# Patient Record
Sex: MALE | Race: WHITE | NOT HISPANIC OR LATINO | Employment: FULL TIME | URBAN - METROPOLITAN AREA
[De-identification: names, ages, dates, MRNs, and addresses within clinical notes are randomized per-mention and may not be internally consistent; named-entity substitution may affect disease eponyms.]

---

## 2023-12-09 ENCOUNTER — HOSPITAL ENCOUNTER (EMERGENCY)
Facility: HOSPITAL | Age: 28
Discharge: HOME | End: 2023-12-09
Attending: EMERGENCY MEDICINE
Payer: COMMERCIAL

## 2023-12-09 VITALS
TEMPERATURE: 97.8 F | DIASTOLIC BLOOD PRESSURE: 75 MMHG | OXYGEN SATURATION: 99 % | SYSTOLIC BLOOD PRESSURE: 136 MMHG | HEART RATE: 80 BPM | RESPIRATION RATE: 18 BRPM | WEIGHT: 166.5 LBS

## 2023-12-09 DIAGNOSIS — S46.811A TRAPEZIUS MUSCLE STRAIN, RIGHT, INITIAL ENCOUNTER: Primary | ICD-10-CM

## 2023-12-09 PROCEDURE — 99282 EMERGENCY DEPT VISIT SF MDM: CPT

## 2023-12-09 PROCEDURE — 99284 EMERGENCY DEPT VISIT MOD MDM: CPT

## 2023-12-09 PROCEDURE — 96372 THER/PROPH/DIAG INJ SC/IM: CPT

## 2023-12-09 RX ORDER — NAPROXEN 500 MG/1
500 TABLET ORAL 2 TIMES DAILY WITH MEALS
Qty: 30 TABLET | Refills: 0 | Status: SHIPPED | OUTPATIENT
Start: 2023-12-09

## 2023-12-09 RX ORDER — LIDOCAINE 50 MG/G
1 PATCH TOPICAL ONCE
Status: DISCONTINUED | OUTPATIENT
Start: 2023-12-09 | End: 2023-12-09 | Stop reason: HOSPADM

## 2023-12-09 RX ORDER — METHOCARBAMOL 500 MG/1
500 TABLET, FILM COATED ORAL 2 TIMES DAILY PRN
Qty: 20 TABLET | Refills: 0 | Status: SHIPPED | OUTPATIENT
Start: 2023-12-09

## 2023-12-09 RX ORDER — KETOROLAC TROMETHAMINE 30 MG/ML
15 INJECTION, SOLUTION INTRAMUSCULAR; INTRAVENOUS ONCE
Status: COMPLETED | OUTPATIENT
Start: 2023-12-09 | End: 2023-12-09

## 2023-12-09 RX ORDER — METHOCARBAMOL 500 MG/1
500 TABLET, FILM COATED ORAL ONCE
Status: COMPLETED | OUTPATIENT
Start: 2023-12-09 | End: 2023-12-09

## 2023-12-09 RX ORDER — LIDOCAINE 50 MG/G
1 PATCH TOPICAL EVERY 24 HOURS
Qty: 15 PATCH | Refills: 0 | Status: SHIPPED | OUTPATIENT
Start: 2023-12-09

## 2023-12-09 RX ADMIN — LIDOCAINE 1 PATCH: 700 PATCH TOPICAL at 05:40

## 2023-12-09 RX ADMIN — KETOROLAC TROMETHAMINE 15 MG: 30 INJECTION, SOLUTION INTRAMUSCULAR; INTRAVENOUS at 05:35

## 2023-12-09 RX ADMIN — METHOCARBAMOL TABLETS 500 MG: 500 TABLET, COATED ORAL at 05:34

## 2023-12-09 NOTE — ED PROVIDER NOTES
History  Chief Complaint   Patient presents with    Neck Pain     Hard time turning his head to the left since last night. Took aleeve without relief. The patient is a 45-year-old male with no pertinent past medical history, presents for evaluation of neck pain. He reports that he was wrestling with someone last night and since that time has had neck pain that is exacerbated when turning his head to the right. No direct trauma to the neck. No associated extremity weakness, paresthesias, or injury to the overlying skin. He took 2 Aleve without relief. None       History reviewed. No pertinent past medical history. History reviewed. No pertinent surgical history. History reviewed. No pertinent family history. I have reviewed and agree with the history as documented. E-Cigarette/Vaping    E-Cigarette Use Never User      E-Cigarette/Vaping Substances     Social History     Tobacco Use    Smoking status: Never    Smokeless tobacco: Never   Vaping Use    Vaping Use: Never used   Substance Use Topics    Alcohol use: Not Currently    Drug use: Never       Review of Systems   Constitutional:  Negative for chills and fever. HENT:  Negative for ear pain and sore throat. Eyes:  Negative for pain and visual disturbance. Respiratory:  Negative for cough and shortness of breath. Cardiovascular:  Negative for chest pain and palpitations. Gastrointestinal:  Negative for abdominal pain and vomiting. Genitourinary:  Negative for dysuria and hematuria. Musculoskeletal:  Positive for neck pain and neck stiffness. Negative for arthralgias and back pain. Skin:  Negative for color change and rash. Neurological:  Negative for seizures, syncope, weakness, numbness and headaches. All other systems reviewed and are negative. Physical Exam  Physical Exam  Vitals and nursing note reviewed. Constitutional:       General: He is awake. Appearance: Normal appearance.  He is well-developed and normal weight. He is not toxic-appearing. HENT:      Head: Normocephalic and atraumatic. Right Ear: External ear normal.      Left Ear: External ear normal.      Nose: Nose normal.      Mouth/Throat:      Lips: Pink. Mouth: Mucous membranes are moist.   Eyes:      General: Lids are normal. Vision grossly intact. Gaze aligned appropriately. Conjunctiva/sclera: Conjunctivae normal.      Pupils: Pupils are equal, round, and reactive to light. Neck:      Meningeal: Brudzinski's sign absent. Cardiovascular:      Rate and Rhythm: Normal rate and regular rhythm. Pulmonary:      Effort: Pulmonary effort is normal. No respiratory distress. Chest:      Chest wall: No deformity, swelling, tenderness, crepitus or edema. Musculoskeletal:      Cervical back: Neck supple. Tenderness present. No rigidity, spasms, torticollis, bony tenderness or crepitus. Pain with movement present. Thoracic back: Tenderness present. No spasms or bony tenderness. Lumbar back: Normal. No spasms, tenderness or bony tenderness. Comments: TTP along the right trapezius muscle distribution. No TTP of the c- or t-spine. No muscle spasm, crepitus, or palpable vertebral deformity. Near complete AROM of the neck, with slightly limited right lateral movement. Full AROM and 5/5 strength in the upper extremities. Normal  strength. Sensation is intact throughout. Skin:     General: Skin is warm. Capillary Refill: Capillary refill takes less than 2 seconds. Coloration: Skin is not pale. Findings: No abrasion, bruising, ecchymosis, erythema, signs of injury, rash or wound. Neurological:      Mental Status: He is alert and oriented to person, place, and time. Psychiatric:         Behavior: Behavior is cooperative.          Vital Signs  ED Triage Vitals [12/09/23 0522]   Temperature Pulse Respirations Blood Pressure SpO2   97.8 °F (36.6 °C) 80 18 136/75 99 %      Temp Source Heart Rate Source Patient Position - Orthostatic VS BP Location FiO2 (%)   Tympanic Monitor Sitting Left arm --      Pain Score       --           Vitals:    12/09/23 0522   BP: 136/75   Pulse: 80   Patient Position - Orthostatic VS: Sitting         ED Medications  Medications   lidocaine (LIDODERM) 5 % patch 1 patch (1 patch Topical Medication Applied 12/9/23 0540)   ketorolac (TORADOL) injection 15 mg (15 mg Intramuscular Given 12/9/23 0535)   methocarbamol (ROBAXIN) tablet 500 mg (500 mg Oral Given 12/9/23 0534)       Diagnostic Studies  Results Reviewed       None                   No orders to display              Procedures  Procedures         ED Course         SBIRT 22yo+      Flowsheet Row Most Recent Value   Initial Alcohol Screen: US AUDIT-C     1. How often do you have a drink containing alcohol? 0 Filed at: 12/09/2023 0524   2. How many drinks containing alcohol do you have on a typical day you are drinking? 0 Filed at: 12/09/2023 0524   3a. Male UNDER 65: How often do you have five or more drinks on one occasion? 0 Filed at: 12/09/2023 0524   3b. FEMALE Any Age, or MALE 65+: How often do you have 4 or more drinks on one occassion? 0 Filed at: 12/09/2023 0524   Audit-C Score 0 Filed at: 12/09/2023 4276   IVET: How many times in the past year have you. .. Used an illegal drug or used a prescription medication for non-medical reasons? Never Filed at: 12/09/2023 3980                Medical Decision Making  Patient presents for acute neck pain. On exam there is TTP of the right trapezius muscle distribution. The upper extremities are neurovascularly intact. Differential diagnosis includes but is not limited to strain, sprain, arthritis, spinal stenosis, herniated disc, or radiculopathy. As there was no direct trauma to the neck, will not pursue imaging, suspicion for fracture is very low. Patient given Toradol, Robaxin, and a lidocaine patch.   Emphasized to the patient that he may not drive or operate any heavy machinery while taking the muscle relaxant. He is in agreement with the treatment plan and all questions were answered. Patient verbalized understanding of both the muscle relaxant and ED return precautions. Follow-up with PCP, and return to the ED in the interim for new or worsening symptoms. Problems Addressed:  Trapezius muscle strain, right, initial encounter: acute illness or injury    Risk  Prescription drug management. Disposition  Final diagnoses:   Trapezius muscle strain, right, initial encounter     Time reflects when diagnosis was documented in both MDM as applicable and the Disposition within this note       Time User Action Codes Description Comment    12/9/2023  5:34 AM Michelle Shafer Add [K58.146W] Trapezius muscle strain, right, initial encounter           ED Disposition       ED Disposition   Discharge    Condition   Stable    Date/Time   Sat Dec 9, 2023 1405 [facility] discharge to home/self care. Follow-up Information       Follow up With Specialties Details Why Contact Info    Your PCP                Patient's Medications   Discharge Prescriptions    LIDOCAINE (LIDODERM) 5 %    Apply 1 patch topically over 12 hours every 24 hours Remove & Discard patch within 12 hours or as directed by MD       Start Date: 12/9/2023 End Date: --       Order Dose: 1 patch       Quantity: 15 patch    Refills: 0    METHOCARBAMOL (ROBAXIN) 500 MG TABLET    Take 1 tablet (500 mg total) by mouth 2 (two) times a day as needed for muscle spasms       Start Date: 12/9/2023 End Date: --       Order Dose: 500 mg       Quantity: 20 tablet    Refills: 0    NAPROXEN (NAPROSYN) 500 MG TABLET    Take 1 tablet (500 mg total) by mouth 2 (two) times a day with meals       Start Date: 12/9/2023 End Date: --       Order Dose: 500 mg       Quantity: 30 tablet    Refills: 0       No discharge procedures on file.     PDMP Review       None            ED Provider  Electronically Signed by             Casimiro Bustamante PA-C  12/09/23 5906